# Patient Record
Sex: FEMALE | Race: WHITE | NOT HISPANIC OR LATINO | Employment: FULL TIME | URBAN - METROPOLITAN AREA
[De-identification: names, ages, dates, MRNs, and addresses within clinical notes are randomized per-mention and may not be internally consistent; named-entity substitution may affect disease eponyms.]

---

## 2022-01-19 ENCOUNTER — TELEPHONE (OUTPATIENT)
Dept: NEUROLOGY | Facility: CLINIC | Age: 52
End: 2022-01-19
Payer: COMMERCIAL

## 2022-01-19 NOTE — TELEPHONE ENCOUNTER
----- Message from Aura Mensah sent at 1/19/2022  3:21 PM CST -----  Contact: Patient  Type:   Appointment Request    Name of Caller:Patient   When is the first available appointment?no appointment generated  Symptoms:Referral by podiatry for nerve pain  Would the patient rather a call back or a response via [facility]? Call back  Best Call Back Number:[phone]  Additional Information: Please assist

## 2022-01-19 NOTE — TELEPHONE ENCOUNTER
Spoke with pt in regards to needing appt to be scheduled. Advise her she would need a referral by an [facility] in regards to getting scheduled. Pt verbalized understanding.

## 2022-01-27 PROBLEM — Z87.42 HISTORY OF ABNORMAL CERVICAL PAP SMEAR: Status: ACTIVE | Noted: 2022-01-27

## 2022-05-20 ENCOUNTER — OFFICE VISIT (OUTPATIENT)
Dept: UROLOGY | Facility: CLINIC | Age: 52
End: 2022-05-20
Payer: COMMERCIAL

## 2022-05-20 VITALS
WEIGHT: 179 LBS | DIASTOLIC BLOOD PRESSURE: 80 MMHG | HEIGHT: 72 IN | SYSTOLIC BLOOD PRESSURE: 110 MMHG | BODY MASS INDEX: 24.24 KG/M2

## 2022-05-20 DIAGNOSIS — N30.00 ACUTE CYSTITIS WITHOUT HEMATURIA: ICD-10-CM

## 2022-05-20 DIAGNOSIS — R10.2 PELVIC PAIN: Primary | ICD-10-CM

## 2022-05-20 DIAGNOSIS — R39.15 URINARY URGENCY: ICD-10-CM

## 2022-05-20 PROCEDURE — 1159F PR MEDICATION LIST DOCUMENTED IN MEDICAL RECORD: ICD-10-PCS | Mod: CPTII,S$GLB,, | Performed by: UROLOGY

## 2022-05-20 PROCEDURE — 3079F DIAST BP 80-89 MM HG: CPT | Mod: CPTII,S$GLB,, | Performed by: UROLOGY

## 2022-05-20 PROCEDURE — 87186 SC STD MICRODIL/AGAR DIL: CPT | Performed by: UROLOGY

## 2022-05-20 PROCEDURE — 3008F PR BODY MASS INDEX (BMI) DOCUMENTED: ICD-10-PCS | Mod: CPTII,S$GLB,, | Performed by: UROLOGY

## 2022-05-20 PROCEDURE — 1159F MED LIST DOCD IN RCRD: CPT | Mod: CPTII,S$GLB,, | Performed by: UROLOGY

## 2022-05-20 PROCEDURE — 99999 PR PBB SHADOW E&M-EST. PATIENT-LVL III: ICD-10-PCS | Mod: PBBFAC,,, | Performed by: UROLOGY

## 2022-05-20 PROCEDURE — 99204 PR OFFICE/OUTPT VISIT, NEW, LEVL IV, 45-59 MIN: ICD-10-PCS | Mod: S$GLB,,, | Performed by: UROLOGY

## 2022-05-20 PROCEDURE — 3074F SYST BP LT 130 MM HG: CPT | Mod: CPTII,S$GLB,, | Performed by: UROLOGY

## 2022-05-20 PROCEDURE — 3079F PR MOST RECENT DIASTOLIC BLOOD PRESSURE 80-89 MM HG: ICD-10-PCS | Mod: CPTII,S$GLB,, | Performed by: UROLOGY

## 2022-05-20 PROCEDURE — 3008F BODY MASS INDEX DOCD: CPT | Mod: CPTII,S$GLB,, | Performed by: UROLOGY

## 2022-05-20 PROCEDURE — 87088 URINE BACTERIA CULTURE: CPT | Performed by: UROLOGY

## 2022-05-20 PROCEDURE — 87077 CULTURE AEROBIC IDENTIFY: CPT | Performed by: UROLOGY

## 2022-05-20 PROCEDURE — 3074F PR MOST RECENT SYSTOLIC BLOOD PRESSURE < 130 MM HG: ICD-10-PCS | Mod: CPTII,S$GLB,, | Performed by: UROLOGY

## 2022-05-20 PROCEDURE — 99204 OFFICE O/P NEW MOD 45 MIN: CPT | Mod: S$GLB,,, | Performed by: UROLOGY

## 2022-05-20 PROCEDURE — 87086 URINE CULTURE/COLONY COUNT: CPT | Performed by: UROLOGY

## 2022-05-20 PROCEDURE — 99999 PR PBB SHADOW E&M-EST. PATIENT-LVL III: CPT | Mod: PBBFAC,,, | Performed by: UROLOGY

## 2022-05-20 RX ORDER — NAPROXEN 500 MG/1
500 TABLET ORAL
COMMUNITY
Start: 2022-04-12 | End: 2023-05-01

## 2022-05-20 RX ORDER — PREDNISOLONE SODIUM PHOSPHATE 15 MG/1
15 TABLET, ORALLY DISINTEGRATING ORAL 2 TIMES DAILY
COMMUNITY
Start: 2022-05-04 | End: 2023-05-01

## 2022-05-20 RX ORDER — SOLIFENACIN SUCCINATE 10 MG/1
10 TABLET, FILM COATED ORAL DAILY
Qty: 30 TABLET | Refills: 3 | Status: SHIPPED | OUTPATIENT
Start: 2022-05-20 | End: 2022-08-02 | Stop reason: SDUPTHER

## 2022-05-20 RX ORDER — SULFAMETHOXAZOLE AND TRIMETHOPRIM 800; 160 MG/1; MG/1
1 TABLET ORAL 2 TIMES DAILY
Qty: 14 TABLET | Refills: 0 | Status: SHIPPED | OUTPATIENT
Start: 2022-05-20 | End: 2022-08-02

## 2022-05-20 NOTE — PROGRESS NOTES
Chief Complaint   Patient presents with    Other     New patient-urge incontience       Referring Provider: Dr. Amy Monet      History of Present Illness:   Sara Trammell is a 51 y.o. female here for evaluation of Other (New patient-urge incontience)  .   5/20/22-52yo female referred for urge urinary incontinence. She has h/o pubovaginal sling in 2010 at the time of hysterectomy. She was previously seeing Dr. Minor. Pt reports a diagnosis of IC. She reports that she has flare ups that last for 7-10 days. Limits food seasoning and acidic foods. Stress also flares symptoms. She reports having sharp pain if she holds her urine any longer than 2 hours. Recently, she has developed urgency and UUI if she moves. She reports feeling a dip in her vagina, which makes BMs difficult. She reports that she did have what sounds like a rectocele repair 4 years ago. The dip has been there since the rectocele repair. Was treated for E. Coli UTI 3 months ago.   Urine started turning dark a week ago.       Review of Systems   Genitourinary: Positive for pelvic pain and urgency.   All other systems reviewed and are negative.      Past Medical History:   Diagnosis Date    MARK II (cervical intraepithelial neoplasia II)     IBS (irritable bowel syndrome)     Migraine        Past Surgical History:   Procedure Laterality Date    AUGMENTATION OF BREAST      BLADDER SUSPENSION      EYE SURGERY      FOOT SURGERY      HEMORRHOID SURGERY      REPAIR OF RECTOCELE      FLORINA LSO      MARK 2    TUBAL LIGATION         Family History   Problem Relation Age of Onset    Heart disease Maternal Grandmother        Social History     Tobacco Use    Smoking status: Former Smoker    Smokeless tobacco: Never Used       Current Outpatient Medications   Medication Sig Dispense Refill    FLUoxetine 20 MG capsule Take 1 capsule by mouth once daily.      multivitamin capsule Take 1 capsule by mouth once daily.      naproxen (NAPROSYN) 500  MG tablet Take 500 mg by mouth.      prednisoLONE (ORAPRED ODT) 15 MG disintegrating tablet Take 15 mg by mouth 2 (two) times daily.      rosuvastatin (CRESTOR) 5 MG tablet Take 1 tablet by mouth once daily.      zolpidem (AMBIEN) 10 mg Tab Take 1 tablet by mouth nightly as needed.      solifenacin (VESICARE) 10 MG tablet Take 1 tablet (10 mg total) by mouth once daily. 30 tablet 3    sulfamethoxazole-trimethoprim 800-160mg (BACTRIM DS) 800-160 mg Tab Take 1 tablet by mouth 2 (two) times daily. 14 tablet 0     No current facility-administered medications for this visit.       Review of patient's allergies indicates:   Allergen Reactions    Codeine Shortness Of Breath     Respiratory Arrest      Statins-hmg-coa reductase inhibitors Other (See Comments)     Myalgias       Physical Exam  Vitals:    05/20/22 1028   BP: 110/80     General: Well-developed, well-nourished, in no acute distress  HEENT: Normocephalic, atraumatic, extraocular movements intact  Neck: Supple, no supraclavicular or cervical lymphadenopathy, trachea midline  Respirations: even and unlabored  Back: midline spine, No CVA tenderness  Abdomen: soft, Non-tender, non-distended, no palpable masses, no rebound or guarding  : Normal external female genitalia without lesions. Orthotopic urethral meatus. Healthy vaginal mucosa. Grade 1 Cysotcele, Grade 1 Rectocele, No apical prolapse, negative cough stress test, no urethral hypermobility  Extremities: moves all equally, no clubbing, cyanosis or edema  Skin: Warm and dry. No lesions  Psych: normal affect  Neuro: Alert and oriented x 3. Cranial nerves II-XII intact      Urinalysis  Nitrite positive, otherwise negative    PVR: 41cc 5/20/22    Assessment:  1. Pelvic pain  CULTURE, URINE   2. Acute cystitis without hematuria     3. Urinary urgency           Plan:   Pelvic pain  -     CULTURE, URINE    Acute cystitis without hematuria    Urinary urgency    Other orders  -      sulfamethoxazole-trimethoprim 800-160mg (BACTRIM DS) 800-160 mg Tab; Take 1 tablet by mouth 2 (two) times daily.  Dispense: 14 tablet; Refill: 0  -     solifenacin (VESICARE) 10 MG tablet; Take 1 tablet (10 mg total) by mouth once daily.  Dispense: 30 tablet; Refill: 3        Records from Dr. Minor  Follow up in about 2 months (around 7/20/2022).

## 2022-05-23 LAB — BACTERIA UR CULT: ABNORMAL

## 2022-05-29 ENCOUNTER — PATIENT MESSAGE (OUTPATIENT)
Dept: UROLOGY | Facility: CLINIC | Age: 52
End: 2022-05-29
Payer: COMMERCIAL

## 2022-05-29 DIAGNOSIS — R39.15 URINARY URGENCY: Primary | ICD-10-CM

## 2022-05-29 DIAGNOSIS — N30.00 ACUTE CYSTITIS WITHOUT HEMATURIA: ICD-10-CM

## 2022-05-31 ENCOUNTER — PATIENT MESSAGE (OUTPATIENT)
Dept: UROLOGY | Facility: CLINIC | Age: 52
End: 2022-05-31
Payer: COMMERCIAL

## 2022-06-01 ENCOUNTER — LAB VISIT (OUTPATIENT)
Dept: LAB | Facility: HOSPITAL | Age: 52
End: 2022-06-01
Attending: UROLOGY
Payer: COMMERCIAL

## 2022-06-01 DIAGNOSIS — R39.15 URINARY URGENCY: ICD-10-CM

## 2022-06-01 LAB
BILIRUB UR QL STRIP: NEGATIVE
CLARITY UR: CLEAR
COLOR UR: YELLOW
GLUCOSE UR QL STRIP: NEGATIVE
HGB UR QL STRIP: NEGATIVE
KETONES UR QL STRIP: NEGATIVE
LEUKOCYTE ESTERASE UR QL STRIP: NEGATIVE
NITRITE UR QL STRIP: NEGATIVE
PH UR STRIP: 8 [PH] (ref 5–8)
PROT UR QL STRIP: NEGATIVE
SP GR UR STRIP: <=1.005 (ref 1–1.03)
URN SPEC COLLECT METH UR: NORMAL

## 2022-06-01 PROCEDURE — 81003 URINALYSIS AUTO W/O SCOPE: CPT | Performed by: UROLOGY

## 2022-06-01 PROCEDURE — 87086 URINE CULTURE/COLONY COUNT: CPT | Performed by: UROLOGY

## 2022-06-02 LAB
BACTERIA UR CULT: NORMAL
BACTERIA UR CULT: NORMAL

## 2022-08-02 ENCOUNTER — OFFICE VISIT (OUTPATIENT)
Dept: UROLOGY | Facility: CLINIC | Age: 52
End: 2022-08-02
Payer: COMMERCIAL

## 2022-08-02 VITALS — BODY MASS INDEX: 24.28 KG/M2 | DIASTOLIC BLOOD PRESSURE: 70 MMHG | SYSTOLIC BLOOD PRESSURE: 110 MMHG | WEIGHT: 179 LBS

## 2022-08-02 DIAGNOSIS — N30.10 INTERSTITIAL CYSTITIS: ICD-10-CM

## 2022-08-02 DIAGNOSIS — R31.29 MICROHEMATURIA: Primary | ICD-10-CM

## 2022-08-02 DIAGNOSIS — N32.81 OAB (OVERACTIVE BLADDER): ICD-10-CM

## 2022-08-02 LAB
BACTERIA #/AREA URNS AUTO: ABNORMAL /HPF
MICROSCOPIC COMMENT: ABNORMAL
RBC #/AREA URNS AUTO: 9 /HPF (ref 0–4)
WBC #/AREA URNS AUTO: 7 /HPF (ref 0–5)

## 2022-08-02 PROCEDURE — 87086 URINE CULTURE/COLONY COUNT: CPT | Performed by: UROLOGY

## 2022-08-02 PROCEDURE — 99213 PR OFFICE/OUTPT VISIT, EST, LEVL III, 20-29 MIN: ICD-10-PCS | Mod: S$GLB,,, | Performed by: UROLOGY

## 2022-08-02 PROCEDURE — 87088 URINE BACTERIA CULTURE: CPT | Performed by: UROLOGY

## 2022-08-02 PROCEDURE — 3008F BODY MASS INDEX DOCD: CPT | Mod: CPTII,S$GLB,, | Performed by: UROLOGY

## 2022-08-02 PROCEDURE — 99213 OFFICE O/P EST LOW 20 MIN: CPT | Mod: S$GLB,,, | Performed by: UROLOGY

## 2022-08-02 PROCEDURE — 87186 SC STD MICRODIL/AGAR DIL: CPT | Performed by: UROLOGY

## 2022-08-02 PROCEDURE — 3078F PR MOST RECENT DIASTOLIC BLOOD PRESSURE < 80 MM HG: ICD-10-PCS | Mod: CPTII,S$GLB,, | Performed by: UROLOGY

## 2022-08-02 PROCEDURE — 3074F SYST BP LT 130 MM HG: CPT | Mod: CPTII,S$GLB,, | Performed by: UROLOGY

## 2022-08-02 PROCEDURE — 99999 PR PBB SHADOW E&M-EST. PATIENT-LVL II: ICD-10-PCS | Mod: PBBFAC,,, | Performed by: UROLOGY

## 2022-08-02 PROCEDURE — 81001 URINALYSIS AUTO W/SCOPE: CPT | Performed by: UROLOGY

## 2022-08-02 PROCEDURE — 3008F PR BODY MASS INDEX (BMI) DOCUMENTED: ICD-10-PCS | Mod: CPTII,S$GLB,, | Performed by: UROLOGY

## 2022-08-02 PROCEDURE — 87077 CULTURE AEROBIC IDENTIFY: CPT | Performed by: UROLOGY

## 2022-08-02 PROCEDURE — 3078F DIAST BP <80 MM HG: CPT | Mod: CPTII,S$GLB,, | Performed by: UROLOGY

## 2022-08-02 PROCEDURE — 3074F PR MOST RECENT SYSTOLIC BLOOD PRESSURE < 130 MM HG: ICD-10-PCS | Mod: CPTII,S$GLB,, | Performed by: UROLOGY

## 2022-08-02 PROCEDURE — 99999 PR PBB SHADOW E&M-EST. PATIENT-LVL II: CPT | Mod: PBBFAC,,, | Performed by: UROLOGY

## 2022-08-02 RX ORDER — SOLIFENACIN SUCCINATE 10 MG/1
10 TABLET, FILM COATED ORAL DAILY
Qty: 30 TABLET | Refills: 11 | Status: SHIPPED | OUTPATIENT
Start: 2022-08-02 | End: 2023-05-01 | Stop reason: SDUPTHER

## 2022-08-02 RX ORDER — GABAPENTIN 100 MG/1
1 CAPSULE ORAL 3 TIMES DAILY
COMMUNITY
Start: 2022-07-21 | End: 2023-05-01

## 2022-08-02 NOTE — PROGRESS NOTES
CC: follow up frequency, pelvic pain    History of Present Illness:     8/2/22-On vesicare. No longer having the sharp pain. Frequency is improved. No gross hematuria or recent UTI.   5/20/22-50yo female referred for urge urinary incontinence. She has h/o pubovaginal sling in 2010 at the time of hysterectomy. She was previously seeing Dr. Minor. Pt reports a diagnosis of IC. She reports that she has flare ups that last for 7-10 days. Limits food seasoning and acidic foods. Stress also flares symptoms. She reports having sharp pain if she holds her urine any longer than 2 hours. Recently, she has developed urgency and UUI if she moves. She reports feeling a dip in her vagina, which makes BMs difficult. She reports that she did have what sounds like a rectocele repair 4 years ago. The dip has been there since the rectocele repair. Was treated for E. Coli UTI 3 months ago.   Urine started turning dark a week ago.       Review of Systems   Gastrointestinal: Positive for constipation.   Genitourinary: Negative for difficulty urinating, dysuria and hematuria.   All other systems reviewed and are negative.      Past Medical History:   Diagnosis Date    MARK II (cervical intraepithelial neoplasia II)     IBS (irritable bowel syndrome)     Migraine        Past Surgical History:   Procedure Laterality Date    AUGMENTATION OF BREAST      BLADDER SUSPENSION      EYE SURGERY      FOOT SURGERY      HEMORRHOID SURGERY      REPAIR OF RECTOCELE      FLORINA LSO      MARK 2    TUBAL LIGATION         Family History   Problem Relation Age of Onset    Colon cancer Mother     Colon cancer Father     Heart disease Maternal Grandmother        Social History     Tobacco Use    Smoking status: Former Smoker    Smokeless tobacco: Never Used       Current Outpatient Medications   Medication Sig Dispense Refill    FLUoxetine 20 MG capsule Take 1 capsule by mouth once daily.      gabapentin (NEURONTIN) 100 MG capsule Take 1  capsule by mouth 3 (three) times daily.      multivitamin capsule Take 1 capsule by mouth once daily.      rosuvastatin (CRESTOR) 5 MG tablet Take 1 tablet by mouth once daily.      zolpidem (AMBIEN) 10 mg Tab Take 1 tablet by mouth nightly as needed.      naproxen (NAPROSYN) 500 MG tablet Take 500 mg by mouth.      prednisoLONE (ORAPRED ODT) 15 MG disintegrating tablet Take 15 mg by mouth 2 (two) times daily.      solifenacin (VESICARE) 10 MG tablet Take 1 tablet (10 mg total) by mouth once daily. 30 tablet 11     No current facility-administered medications for this visit.       Review of patient's allergies indicates:   Allergen Reactions    Codeine Shortness Of Breath     Respiratory Arrest      Statins-hmg-coa reductase inhibitors Other (See Comments)     Myalgias       Physical Exam  Vitals:    08/02/22 0921   BP: 110/70     General: Well-developed, well-nourished, in no acute distress  HEENT: Normocephalic, atraumatic, extraocular movements intact  Neck: Supple, no supraclavicular or cervical lymphadenopathy, trachea midline  Respirations: even and unlabored  : 5/20/22-Normal external female genitalia without lesions. Orthotopic urethral meatus. Healthy vaginal mucosa. Grade 1 Cysotcele, Grade 1 Rectocele, No apical prolapse, negative cough stress test, no urethral hypermobility  Extremities: moves all equally, no clubbing, cyanosis or edema  Skin: Warm and dry. No lesions  Psych: normal affect  Neuro: Alert and oriented x 3. Cranial nerves II-XII intact      Urinalysis  Trace blood, otherwise negative    PVR: 41cc 5/20/22    Assessment:  1. Microhematuria  Urinalysis Microscopic    Urine culture   2. OAB (overactive bladder)     3. Interstitial cystitis           Plan:   Microhematuria  -     Urinalysis Microscopic  -     Urine culture    OAB (overactive bladder)    Interstitial cystitis    Other orders  -     solifenacin (VESICARE) 10 MG tablet; Take 1 tablet (10 mg total) by mouth once daily.   Dispense: 30 tablet; Refill: 11        Re-request Records from Dr. Minor  Follow up in about 6 months (around 2/2/2023).

## 2022-08-04 PROBLEM — N30.10 INTERSTITIAL CYSTITIS: Status: ACTIVE | Noted: 2022-08-04

## 2022-08-04 PROBLEM — N32.81 OAB (OVERACTIVE BLADDER): Status: ACTIVE | Noted: 2022-08-04

## 2022-08-05 ENCOUNTER — PATIENT MESSAGE (OUTPATIENT)
Dept: UROLOGY | Facility: CLINIC | Age: 52
End: 2022-08-05
Payer: COMMERCIAL

## 2022-08-05 LAB — BACTERIA UR CULT: ABNORMAL

## 2022-08-07 ENCOUNTER — TELEPHONE (OUTPATIENT)
Dept: UROLOGY | Facility: HOSPITAL | Age: 52
End: 2022-08-07
Payer: COMMERCIAL

## 2022-08-07 RX ORDER — NITROFURANTOIN 25; 75 MG/1; MG/1
100 CAPSULE ORAL 2 TIMES DAILY
Qty: 14 CAPSULE | Refills: 0 | Status: SHIPPED | OUTPATIENT
Start: 2022-08-07 | End: 2023-05-01

## 2022-08-07 NOTE — TELEPHONE ENCOUNTER
Called pt regarding positive urine culture. Macrobid sent to pharmacy. Pt expressed understanding.

## 2022-09-27 ENCOUNTER — PATIENT MESSAGE (OUTPATIENT)
Dept: UROLOGY | Facility: CLINIC | Age: 52
End: 2022-09-27
Payer: COMMERCIAL

## 2022-09-27 ENCOUNTER — TELEPHONE (OUTPATIENT)
Dept: UROLOGY | Facility: CLINIC | Age: 52
End: 2022-09-27
Payer: COMMERCIAL

## 2022-09-27 ENCOUNTER — LAB VISIT (OUTPATIENT)
Dept: LAB | Facility: HOSPITAL | Age: 52
End: 2022-09-27
Attending: UROLOGY
Payer: COMMERCIAL

## 2022-09-27 DIAGNOSIS — R39.15 URINARY URGENCY: ICD-10-CM

## 2022-09-27 DIAGNOSIS — R39.15 URINARY URGENCY: Primary | ICD-10-CM

## 2022-09-27 LAB
BILIRUB UR QL STRIP: NEGATIVE
CLARITY UR REFRACT.AUTO: CLEAR
COLOR UR AUTO: YELLOW
GLUCOSE UR QL STRIP: NEGATIVE
HGB UR QL STRIP: NEGATIVE
KETONES UR QL STRIP: NEGATIVE
LEUKOCYTE ESTERASE UR QL STRIP: NEGATIVE
NITRITE UR QL STRIP: NEGATIVE
PH UR STRIP: 8 [PH] (ref 5–8)
PROT UR QL STRIP: NEGATIVE
SP GR UR STRIP: 1.01 (ref 1–1.03)
URN SPEC COLLECT METH UR: NORMAL

## 2022-09-27 PROCEDURE — 81003 URINALYSIS AUTO W/O SCOPE: CPT | Performed by: UROLOGY

## 2022-09-27 PROCEDURE — 87086 URINE CULTURE/COLONY COUNT: CPT | Performed by: UROLOGY

## 2022-09-27 NOTE — TELEPHONE ENCOUNTER
Contacted pt regarding UTI. Pt states she's having an odor and cloudy urine. Pt will do urine culture and urinalysis today. Pt will like antibiotic to be send to [facility]    DISPLAY PLAN FREE TEXT

## 2022-09-28 ENCOUNTER — TELEPHONE (OUTPATIENT)
Dept: UROLOGY | Facility: CLINIC | Age: 52
End: 2022-09-28
Payer: COMMERCIAL

## 2022-09-28 RX ORDER — CEFDINIR 300 MG/1
300 CAPSULE ORAL 2 TIMES DAILY
Qty: 14 CAPSULE | Refills: 0 | Status: SHIPPED | OUTPATIENT
Start: 2022-09-28 | End: 2022-10-05

## 2022-09-28 NOTE — TELEPHONE ENCOUNTER
Notify patient UA looked clear, but I have sent cefdinir to her pharmacy that she can start until the culture returns.

## 2022-09-28 NOTE — TELEPHONE ENCOUNTER
Called and spoke with pt, pt informed that u/a is clear but for her to take the antibiotic she called out until her culture comes back. Pt verbalized understanding.

## 2022-09-29 LAB — BACTERIA UR CULT: NO GROWTH

## 2023-05-01 ENCOUNTER — OFFICE VISIT (OUTPATIENT)
Dept: UROLOGY | Facility: CLINIC | Age: 53
End: 2023-05-01
Payer: COMMERCIAL

## 2023-05-01 VITALS
BODY MASS INDEX: 24.01 KG/M2 | HEART RATE: 76 BPM | WEIGHT: 177 LBS | DIASTOLIC BLOOD PRESSURE: 80 MMHG | SYSTOLIC BLOOD PRESSURE: 120 MMHG

## 2023-05-01 DIAGNOSIS — N30.10 INTERSTITIAL CYSTITIS: ICD-10-CM

## 2023-05-01 DIAGNOSIS — R39.15 URINARY URGENCY: Primary | ICD-10-CM

## 2023-05-01 DIAGNOSIS — K59.09 CHRONIC CONSTIPATION: ICD-10-CM

## 2023-05-01 LAB
BILIRUB SERPL-MCNC: ABNORMAL MG/DL
BLOOD URINE, POC: ABNORMAL
CLARITY, POC UA: CLEAR
COLOR, POC UA: YELLOW
GLUCOSE UR QL STRIP: ABNORMAL
KETONES UR QL STRIP: ABNORMAL
LEUKOCYTE ESTERASE URINE, POC: ABNORMAL
NITRITE, POC UA: ABNORMAL
PH, POC UA: 6.5
PROTEIN, POC: ABNORMAL
SPECIFIC GRAVITY, POC UA: 1.01
UROBILINOGEN, POC UA: 0.2

## 2023-05-01 PROCEDURE — 1159F PR MEDICATION LIST DOCUMENTED IN MEDICAL RECORD: ICD-10-PCS | Mod: CPTII,S$GLB,, | Performed by: UROLOGY

## 2023-05-01 PROCEDURE — 1159F MED LIST DOCD IN RCRD: CPT | Mod: CPTII,S$GLB,, | Performed by: UROLOGY

## 2023-05-01 PROCEDURE — 3008F PR BODY MASS INDEX (BMI) DOCUMENTED: ICD-10-PCS | Mod: CPTII,S$GLB,, | Performed by: UROLOGY

## 2023-05-01 PROCEDURE — 81002 URINALYSIS NONAUTO W/O SCOPE: CPT | Mod: S$GLB,,, | Performed by: UROLOGY

## 2023-05-01 PROCEDURE — 3074F PR MOST RECENT SYSTOLIC BLOOD PRESSURE < 130 MM HG: ICD-10-PCS | Mod: CPTII,S$GLB,, | Performed by: UROLOGY

## 2023-05-01 PROCEDURE — 1160F RVW MEDS BY RX/DR IN RCRD: CPT | Mod: CPTII,S$GLB,, | Performed by: UROLOGY

## 2023-05-01 PROCEDURE — 3079F DIAST BP 80-89 MM HG: CPT | Mod: CPTII,S$GLB,, | Performed by: UROLOGY

## 2023-05-01 PROCEDURE — 3079F PR MOST RECENT DIASTOLIC BLOOD PRESSURE 80-89 MM HG: ICD-10-PCS | Mod: CPTII,S$GLB,, | Performed by: UROLOGY

## 2023-05-01 PROCEDURE — 99999 PR PBB SHADOW E&M-EST. PATIENT-LVL III: ICD-10-PCS | Mod: PBBFAC,,, | Performed by: UROLOGY

## 2023-05-01 PROCEDURE — 3074F SYST BP LT 130 MM HG: CPT | Mod: CPTII,S$GLB,, | Performed by: UROLOGY

## 2023-05-01 PROCEDURE — 99214 PR OFFICE/OUTPT VISIT, EST, LEVL IV, 30-39 MIN: ICD-10-PCS | Mod: S$GLB,,, | Performed by: UROLOGY

## 2023-05-01 PROCEDURE — 99214 OFFICE O/P EST MOD 30 MIN: CPT | Mod: S$GLB,,, | Performed by: UROLOGY

## 2023-05-01 PROCEDURE — 1160F PR REVIEW ALL MEDS BY PRESCRIBER/CLIN PHARMACIST DOCUMENTED: ICD-10-PCS | Mod: CPTII,S$GLB,, | Performed by: UROLOGY

## 2023-05-01 PROCEDURE — 99999 PR PBB SHADOW E&M-EST. PATIENT-LVL III: CPT | Mod: PBBFAC,,, | Performed by: UROLOGY

## 2023-05-01 PROCEDURE — 3008F BODY MASS INDEX DOCD: CPT | Mod: CPTII,S$GLB,, | Performed by: UROLOGY

## 2023-05-01 PROCEDURE — 81002 POCT URINE DIPSTICK WITHOUT MICROSCOPE: ICD-10-PCS | Mod: S$GLB,,, | Performed by: UROLOGY

## 2023-05-01 RX ORDER — AMITRIPTYLINE HYDROCHLORIDE 25 MG/1
25 TABLET, FILM COATED ORAL NIGHTLY PRN
COMMUNITY
End: 2024-03-04

## 2023-05-01 RX ORDER — CELECOXIB 200 MG/1
200 CAPSULE ORAL
COMMUNITY
End: 2024-03-04

## 2023-05-01 RX ORDER — PITAVASTATIN CALCIUM 2.09 MG/1
2 TABLET, FILM COATED ORAL NIGHTLY
COMMUNITY
End: 2024-03-04

## 2023-05-01 RX ORDER — CEFDINIR 300 MG/1
300 CAPSULE ORAL 2 TIMES DAILY
COMMUNITY
End: 2024-03-04

## 2023-05-01 RX ORDER — SOLIFENACIN SUCCINATE 10 MG/1
10 TABLET, FILM COATED ORAL DAILY
Qty: 90 TABLET | Refills: 4 | Status: SHIPPED | OUTPATIENT
Start: 2023-05-01 | End: 2024-04-30

## 2023-05-01 NOTE — PROGRESS NOTES
CC: follow up frequency, pelvic pain    History of Present Illness:     5/1/23- No pain at this point. She still has frequency because she is out of vesicare. She does have some chronic constipation, but no different.   8/2/22-On vesicare. No longer having the sharp pain. Frequency is improved. No gross hematuria or recent UTI.   5/20/22-52yo female referred for urge urinary incontinence. She has h/o pubovaginal sling in 2010 at the time of hysterectomy. She was previously seeing Dr. Minor. Pt reports a diagnosis of IC. She reports that she has flare ups that last for 7-10 days. Limits food seasoning and acidic foods. Stress also flares symptoms. She reports having sharp pain if she holds her urine any longer than 2 hours. Recently, she has developed urgency and UUI if she moves. She reports feeling a dip in her vagina, which makes BMs difficult. She reports that she did have what sounds like a rectocele repair 4 years ago. The dip has been there since the rectocele repair. Was treated for E. Coli UTI 3 months ago.   Urine started turning dark a week ago.       Review of Systems   Gastrointestinal:  Positive for constipation.   Genitourinary:  Negative for difficulty urinating, dysuria and hematuria.   All other systems reviewed and are negative.    Past Medical History:   Diagnosis Date    MARK II (cervical intraepithelial neoplasia II)     IBS (irritable bowel syndrome)     Migraine        Past Surgical History:   Procedure Laterality Date    AUGMENTATION OF BREAST      BLADDER SUSPENSION      EYE SURGERY      FOOT SURGERY      HEMORRHOID SURGERY      REPAIR OF RECTOCELE      FLORINA LSO      MARK 2    TUBAL LIGATION         Family History   Problem Relation Age of Onset    Colon cancer Mother     Colon cancer Father     Heart disease Maternal Grandmother        Social History     Tobacco Use    Smoking status: Former    Smokeless tobacco: Never       Current Outpatient Medications   Medication Sig Dispense Refill     amitriptyline (ELAVIL) 25 MG tablet Take 25 mg by mouth nightly as needed.      cefdinir (OMNICEF) 300 MG capsule Take 300 mg by mouth 2 (two) times daily.      celecoxib (CELEBREX) 200 MG capsule Take 200 mg by mouth.      pitavastatin calcium (LIVALO) 2 mg Tab tablet Take 2 mg by mouth every evening.      multivitamin capsule Take 1 capsule by mouth once daily.      solifenacin (VESICARE) 10 MG tablet Take 1 tablet (10 mg total) by mouth once daily. 90 tablet 4     No current facility-administered medications for this visit.       Review of patient's allergies indicates:   Allergen Reactions    Codeine Shortness Of Breath     Respiratory Arrest      Statins-hmg-coa reductase inhibitors Other (See Comments)     Myalgias       Physical Exam  Vitals:    05/01/23 1209   BP: 120/80   Pulse: 76     General: Well-developed, well-nourished, in no acute distress  HEENT: Normocephalic, atraumatic, extraocular movements intact  Neck: Supple, no supraclavicular or cervical lymphadenopathy, trachea midline  Respirations: even and unlabored  : 5/20/22-Normal external female genitalia without lesions. Orthotopic urethral meatus. Healthy vaginal mucosa. Grade 1 Cysotcele, Grade 1 Rectocele, No apical prolapse, negative cough stress test, no urethral hypermobility  Extremities: moves all equally, no clubbing, cyanosis or edema  Skin: Warm and dry. No lesions  Psych: normal affect  Neuro: Alert and oriented x 3. Cranial nerves II-XII intact      Urinalysis  Trace blood, otherwise negative    PVR: 41cc 5/20/22    Assessment:  1. Urinary urgency  POCT URINE DIPSTICK WITHOUT MICROSCOPE      2. Interstitial cystitis        3. Chronic constipation              Plan:   Urinary urgency  -     POCT URINE DIPSTICK WITHOUT MICROSCOPE    Interstitial cystitis    Chronic constipation    Other orders  -     solifenacin (VESICARE) 10 MG tablet; Take 1 tablet (10 mg total) by mouth once daily.  Dispense: 90 tablet; Refill: 4      Continue IC  diet  Constipation management  Follow up in about 1 year (around 5/1/2024).

## 2024-05-21 ENCOUNTER — OFFICE VISIT (OUTPATIENT)
Dept: UROLOGY | Facility: CLINIC | Age: 54
End: 2024-05-21
Payer: COMMERCIAL

## 2024-05-21 VITALS
BODY MASS INDEX: 21.08 KG/M2 | WEIGHT: 155.63 LBS | SYSTOLIC BLOOD PRESSURE: 109 MMHG | RESPIRATION RATE: 18 BRPM | DIASTOLIC BLOOD PRESSURE: 77 MMHG | HEART RATE: 76 BPM | HEIGHT: 72 IN

## 2024-05-21 DIAGNOSIS — Z01.818 PRE-OP TESTING: ICD-10-CM

## 2024-05-21 DIAGNOSIS — N30.10 INTERSTITIAL CYSTITIS: ICD-10-CM

## 2024-05-21 DIAGNOSIS — R39.15 URINARY URGENCY: Primary | ICD-10-CM

## 2024-05-21 LAB
BILIRUB UR QL STRIP: NEGATIVE
GLUCOSE UR QL STRIP: NEGATIVE
KETONES UR QL STRIP: NEGATIVE
LEUKOCYTE ESTERASE UR QL STRIP: NEGATIVE
PH, POC UA: 7.5
POC BLOOD, URINE: POSITIVE
POC NITRATES, URINE: NEGATIVE
POC RESIDUAL URINE VOLUME: 205 ML (ref 0–100)
PROT UR QL STRIP: NEGATIVE
SP GR UR STRIP: 1.01 (ref 1–1.03)
UROBILINOGEN UR STRIP-ACNC: 0.2 (ref 0.1–1.1)

## 2024-05-21 PROCEDURE — 1159F MED LIST DOCD IN RCRD: CPT | Mod: CPTII,S$GLB,, | Performed by: UROLOGY

## 2024-05-21 PROCEDURE — 51798 US URINE CAPACITY MEASURE: CPT | Mod: S$GLB,,, | Performed by: UROLOGY

## 2024-05-21 PROCEDURE — 87086 URINE CULTURE/COLONY COUNT: CPT | Performed by: UROLOGY

## 2024-05-21 PROCEDURE — 87186 SC STD MICRODIL/AGAR DIL: CPT | Performed by: UROLOGY

## 2024-05-21 PROCEDURE — 87077 CULTURE AEROBIC IDENTIFY: CPT | Performed by: UROLOGY

## 2024-05-21 PROCEDURE — 3008F BODY MASS INDEX DOCD: CPT | Mod: CPTII,S$GLB,, | Performed by: UROLOGY

## 2024-05-21 PROCEDURE — 3078F DIAST BP <80 MM HG: CPT | Mod: CPTII,S$GLB,, | Performed by: UROLOGY

## 2024-05-21 PROCEDURE — 99999 PR PBB SHADOW E&M-EST. PATIENT-LVL V: CPT | Mod: PBBFAC,,, | Performed by: UROLOGY

## 2024-05-21 PROCEDURE — 3074F SYST BP LT 130 MM HG: CPT | Mod: CPTII,S$GLB,, | Performed by: UROLOGY

## 2024-05-21 PROCEDURE — 81003 URINALYSIS AUTO W/O SCOPE: CPT | Mod: QW,S$GLB,, | Performed by: UROLOGY

## 2024-05-21 PROCEDURE — 87088 URINE BACTERIA CULTURE: CPT | Performed by: UROLOGY

## 2024-05-21 PROCEDURE — 99214 OFFICE O/P EST MOD 30 MIN: CPT | Mod: S$GLB,,, | Performed by: UROLOGY

## 2024-05-21 RX ORDER — ACETAMINOPHEN, DIPHENHYDRAMINE HCL, PHENYLEPHRINE HCL 325; 25; 5 MG/1; MG/1; MG/1
1 TABLET ORAL NIGHTLY
COMMUNITY
Start: 2023-08-01

## 2024-05-21 RX ORDER — CIPROFLOXACIN 2 MG/ML
400 INJECTION, SOLUTION INTRAVENOUS
Status: CANCELLED | OUTPATIENT
Start: 2024-05-21

## 2024-05-21 RX ORDER — LIDOCAINE HYDROCHLORIDE 20 MG/ML
JELLY TOPICAL ONCE
Status: CANCELLED | OUTPATIENT
Start: 2024-05-21 | End: 2024-05-21

## 2024-05-21 NOTE — H&P (VIEW-ONLY)
CC: follow up frequency, pelvic pain    History of Present Illness:     5/21/24-no longer having stinging pain. Feels like she is emptying. Somtimes she has a little UUI, which she describes as several drops. She has been taking vesicare and does feel that it helps. Sometimes she has spasms in her side and uses a bean bag for pressure in the LLQ.   5/1/23- No pain at this point. She still has frequency because she is out of vesicare. She does have some chronic constipation, but no different.   8/2/22-On vesicare. No longer having the sharp pain. Frequency is improved. No gross hematuria or recent UTI.   5/20/22-50yo female referred for urge urinary incontinence. She has h/o pubovaginal sling in 2010 at the time of hysterectomy. She was previously seeing Dr. Minor. Pt reports a diagnosis of IC. She reports that she has flare ups that last for 7-10 days. Limits food seasoning and acidic foods. Stress also flares symptoms. She reports having sharp pain if she holds her urine any longer than 2 hours. Recently, she has developed urgency and UUI if she moves. She reports feeling a dip in her vagina, which makes BMs difficult. She reports that she did have what sounds like a rectocele repair 4 years ago. The dip has been there since the rectocele repair. Was treated for E. Coli UTI 3 months ago.   Urine started turning dark a week ago.       Review of Systems   Gastrointestinal:  Positive for constipation.   Genitourinary:  Negative for difficulty urinating, dysuria and hematuria.   All other systems reviewed and are negative.      Past Medical History:   Diagnosis Date    MARK II (cervical intraepithelial neoplasia II)     IBS (irritable bowel syndrome)     Migraine     Urinary incontinence 2022       Past Surgical History:   Procedure Laterality Date    ABDOMINAL SURGERY      AUGMENTATION OF BREAST      BLADDER SUSPENSION      BREAST SURGERY      COLPORRHAPHY      COLPOSCOPY      EYE SURGERY      FOOT SURGERY       HEMORRHOID SURGERY      REPAIR OF RECTOCELE      FLORINA LSO      MARK 2    TUBAL LIGATION         Family History   Problem Relation Name Age of Onset    Colon cancer Mother Mother     Migraines Mother Mother     Osteoporosis Mother Mother     Colon cancer Father Father     Cancer Father Father     Heart disease Maternal Grandmother         Social History     Tobacco Use    Smoking status: Former     Types: Cigarettes    Smokeless tobacco: Never   Substance Use Topics    Alcohol use: Yes     Alcohol/week: 2.0 standard drinks of alcohol     Types: 2 Glasses of wine per week    Drug use: Not Currently       Current Outpatient Medications   Medication Sig Dispense Refill    calcium carbonate (CALTRATE 600 ORAL) Take by mouth.      ergocalciferol, vitamin D2, (VITAMIN D ORAL) Take by mouth.      melatonin 10 mg Tab Take 1 tablet by mouth every evening.      multivitamin capsule Take 1 capsule by mouth once daily.      solifenacin (VESICARE) 10 MG tablet Take 1 tablet (10 mg total) by mouth once daily. 90 tablet 4    alendronate (FOSAMAX) 70 MG tablet Take 1 tablet (70 mg total) by mouth every 7 days. 4 tablet 11     No current facility-administered medications for this visit.       Review of patient's allergies indicates:   Allergen Reactions    Codeine Shortness Of Breath     Respiratory Arrest      Statins-hmg-coa reductase inhibitors Other (See Comments)     Myalgias       Physical Exam  Vitals:    05/21/24 1559   BP: 109/77   Pulse: 76   Resp: 18     General: Well-developed, well-nourished, in no acute distress  HEENT: Normocephalic, atraumatic, extraocular movements intact  Neck: Supple, no supraclavicular or cervical lymphadenopathy, trachea midline  Respirations: even and unlabored  : 5/20/22-Normal external female genitalia without lesions. Orthotopic urethral meatus. Healthy vaginal mucosa. Grade 1 Cysotcele, Grade 1 Rectocele, No apical prolapse, negative cough stress test, no urethral hypermobility  Extremities:  moves all equally, no clubbing, cyanosis or edema  Skin: Warm and dry. No lesions  Psych: normal affect  Neuro: Alert and oriented x 3. Cranial nerves II-XII intact      Urinalysis  Trace blood, otherwise negative    PVR:   41cc 5/20/22  205mL 5/21/24    Assessment:  1. Urinary urgency  POCT Urinalysis, Dipstick, Automated, W/O Scope    POCT Bladder Scan    Place in Outpatient    Case Request Operating Room: CYSTOSCOPY,WITH BOTULINUM TOXIN INJECTION    Diet NPO    Place sequential compression device    CULTURE, URINE      2. Pre-op testing  CBC Auto Differential    Basic Metabolic Panel    X-Ray Chest PA And Lateral Pre-OP    EKG 12-lead      3. Interstitial cystitis              Plan:   Urinary urgency  -     POCT Urinalysis, Dipstick, Automated, W/O Scope  -     POCT Bladder Scan  -     Place in Outpatient; Standing  -     Case Request Operating Room: CYSTOSCOPY,WITH BOTULINUM TOXIN INJECTION  -     Diet NPO; Standing  -     Place sequential compression device; Standing  -     CULTURE, URINE    Pre-op testing  -     CBC Auto Differential; Future; Expected date: 05/21/2024  -     Basic Metabolic Panel; Future; Expected date: 05/21/2024  -     X-Ray Chest PA And Lateral Pre-OP; Future; Expected date: 05/21/2024  -     EKG 12-lead; Future    Interstitial cystitis    Other orders  -     IP VTE LOW RISK PATIENT; Standing  -     ciprofloxacin (CIPRO)400mg/200ml D5W IVPB 400 mg  -     LIDOcaine HCl 2% urojet    We discussed continued pharmacologic therapy. We also discussed botox, which she is very interested in moving forward with. We discussed the possibility of urinary retention requiring CIC and she understands this and would like to move forward. Stop vesicare.   Continue IC diet  Constipation management

## 2024-05-22 ENCOUNTER — TELEPHONE (OUTPATIENT)
Dept: PREADMISSION TESTING | Facility: HOSPITAL | Age: 54
End: 2024-05-22
Payer: COMMERCIAL

## 2024-05-22 ENCOUNTER — HOSPITAL ENCOUNTER (OUTPATIENT)
Dept: RADIOLOGY | Facility: HOSPITAL | Age: 54
Discharge: HOME | End: 2024-05-22
Attending: UROLOGY
Payer: COMMERCIAL

## 2024-05-22 ENCOUNTER — HOSPITAL ENCOUNTER (OUTPATIENT)
Dept: CARDIOLOGY | Facility: HOSPITAL | Age: 54
Discharge: HOME | End: 2024-05-22
Attending: UROLOGY
Payer: COMMERCIAL

## 2024-05-22 DIAGNOSIS — Z01.818 PRE-OP TESTING: ICD-10-CM

## 2024-05-22 PROCEDURE — 71046 X-RAY EXAM CHEST 2 VIEWS: CPT | Mod: 26,,, | Performed by: RADIOLOGY

## 2024-05-22 PROCEDURE — 93005 ELECTROCARDIOGRAM TRACING: CPT

## 2024-05-22 PROCEDURE — 93010 ELECTROCARDIOGRAM REPORT: CPT | Mod: ,,, | Performed by: INTERNAL MEDICINE

## 2024-05-22 PROCEDURE — 71046 X-RAY EXAM CHEST 2 VIEWS: CPT | Mod: TC

## 2024-05-22 NOTE — TELEPHONE ENCOUNTER
Pre op instructions reviewed with Pt over telephone, verbalized understanding.    To confirm, Surgery is scheduled on 5/23/24.   PLEASE ARRIVE AT 9:00 AM.  *Please report to the [facility] (1st Floor) located off of [facility] (2nd Entrance/Building on the left, in front of the flag pole).  Address: [address] [place] [address] [place] [address]      INSTRUCTIONS IMPORTANT!!!  DO NOT Eat, Drink, or Smoke after 12 midnight unless instructed otherwise by your Surgeon. OK to brush teeth, no gum, candy or mints!    >>>MEDICATION INSTRUCTIONS<<<: Morning of Surgery, take small sip of water with ONLY these medications:  NONE    *Diabetic/ Prediabetic Patients: !!!If you take diabetic or weight loss medication, Do NOT take morning of surgery unless instructed by Doctor!!!  Metformin to be stopped 24 hrs prior to surgery.   Long Acting Insulin Instructions: HOLD the night before surgery unless instructed differently by Provider!  Ozempic/ Mounjaro/ Wegovy/ Trulicity/ Semaglutide injections or weight loss medication to be stopped 7 days prior to surgery.    !!!STOP ALL Aspirins, NSAIDS, WEIGHT LOSS INJECTIONS/PILLS, Herbal supplements, & Vitamins 7 DAYS BEFORE SURGERY!!!    ____  Avoid Alcoholic beverages 3 days prior to surgery, as it can thin the blood.  ____  NO Acrylic/fake nails or nail polish worn day of surgery (specifically hand/arm & foot surgeries).  ____  NO powder, lotions, deodorants, oils or cream on body.  ____  Remove all jewelry & piercings & foreign objects before arrival & leave at home.  ____  Remove Dentures, Hearing Aids & Contact Lens prior to surgery.  ____  Bring photo ID and insurance information to hospital (Leave Valuables at Home).  ____  If going home the same day, arrange for a ride home. You will not be able to drive for 24 hrs if Anesthesia was used.   ____  Females (ages 11-60): may need to give a urine sample the morning of surgery; please see Pre op Nurse prior to using the  restroom.  ____  Males: Stop ED medications (Viagra, Cialis) 24 hrs prior to surgery.  ____  Wear clean, loose fitting clothing to allow for dressings/ bandages.      Bathing Instructions:    -Shower with anti-bacterial Soap (Hibiclens or Dial) the night before surgery and the morning of.   -Do not use Hibiclens on your face or genitals.   -Apply clean clothes after shower.  -Do not shave your face or body 2 days prior to surgery unless instructed otherwise by your Surgeon.  -Do not shave pubic hair 7 days prior to surgery (gyn pt's).    [facility]/Ride Policy:  Only 2 adults allowed in pre op/recovery area during your procedure. You MUST HAVE A RIDE HOME from a responsible adult that you know and trust. Medical Transport, Uber or Lyft can ONLY be used if patient has a responsible adult to accompany them during ride home.       *Signs and symptoms of Infection Before or After Surgery:               !!!If you experience any fever, chills, nausea/ vomiting, foul odor/ excessive drainage from surgical site, flu-like symptoms, new wounds or cuts, PLEASE CALL THE SURGEON OFFICE at [phone] or SEND MESSAGE THROUGH [employer] PORTAL!!!     *If you are running late the morning of surgery, please call the Hospital Surgery Dept @ [phone].     *Billing questions:  [phone]     Thank you,  -[facility] Pre Admit Dept.  [phone] or [phone]  M-F 7:30 am-4:00 pm (Closed Major Holidays)

## 2024-05-23 ENCOUNTER — ANESTHESIA EVENT (OUTPATIENT)
Dept: SURGERY | Facility: HOSPITAL | Age: 54
End: 2024-05-23
Payer: COMMERCIAL

## 2024-05-23 ENCOUNTER — HOSPITAL ENCOUNTER (OUTPATIENT)
Facility: HOSPITAL | Age: 54
Discharge: HOME | End: 2024-05-23
Attending: UROLOGY | Admitting: UROLOGY
Payer: COMMERCIAL

## 2024-05-23 ENCOUNTER — ANESTHESIA (OUTPATIENT)
Dept: SURGERY | Facility: HOSPITAL | Age: 54
End: 2024-05-23
Payer: COMMERCIAL

## 2024-05-23 DIAGNOSIS — N32.81 OAB (OVERACTIVE BLADDER): ICD-10-CM

## 2024-05-23 DIAGNOSIS — R39.15 URINARY URGENCY: Primary | ICD-10-CM

## 2024-05-23 LAB
OHS QRS DURATION: 78 MS
OHS QTC CALCULATION: 448 MS

## 2024-05-23 PROCEDURE — 37000008 HC ANESTHESIA 1ST 15 MINUTES: Performed by: UROLOGY

## 2024-05-23 PROCEDURE — 63600175 PHARM REV CODE 636 W HCPCS: Mod: JZ,JG | Performed by: UROLOGY

## 2024-05-23 PROCEDURE — 71000015 HC POSTOP RECOV 1ST HR: Performed by: UROLOGY

## 2024-05-23 PROCEDURE — 63600175 PHARM REV CODE 636 W HCPCS

## 2024-05-23 PROCEDURE — 25000003 PHARM REV CODE 250

## 2024-05-23 PROCEDURE — 71000033 HC RECOVERY, INTIAL HOUR: Performed by: UROLOGY

## 2024-05-23 PROCEDURE — 36000706: Performed by: UROLOGY

## 2024-05-23 PROCEDURE — 37000009 HC ANESTHESIA EA ADD 15 MINS: Performed by: UROLOGY

## 2024-05-23 PROCEDURE — 63600175 PHARM REV CODE 636 W HCPCS: Performed by: UROLOGY

## 2024-05-23 PROCEDURE — 36000707: Performed by: UROLOGY

## 2024-05-23 PROCEDURE — 52287 CYSTOSCOPY CHEMODENERVATION: CPT | Mod: ,,, | Performed by: UROLOGY

## 2024-05-23 RX ORDER — PROPOFOL 10 MG/ML
VIAL (ML) INTRAVENOUS
Status: DISCONTINUED | OUTPATIENT
Start: 2024-05-23 | End: 2024-05-23

## 2024-05-23 RX ORDER — MIDAZOLAM HYDROCHLORIDE 1 MG/ML
INJECTION INTRAMUSCULAR; INTRAVENOUS
Status: DISCONTINUED | OUTPATIENT
Start: 2024-05-23 | End: 2024-05-23

## 2024-05-23 RX ORDER — FENTANYL CITRATE 50 UG/ML
INJECTION, SOLUTION INTRAMUSCULAR; INTRAVENOUS
Status: DISCONTINUED | OUTPATIENT
Start: 2024-05-23 | End: 2024-05-23

## 2024-05-23 RX ORDER — CIPROFLOXACIN 500 MG/1
500 TABLET ORAL EVERY 12 HOURS
Qty: 6 TABLET | Refills: 0 | Status: SHIPPED | OUTPATIENT
Start: 2024-05-23 | End: 2024-06-18

## 2024-05-23 RX ORDER — PROPOFOL 10 MG/ML
VIAL (ML) INTRAVENOUS CONTINUOUS PRN
Status: DISCONTINUED | OUTPATIENT
Start: 2024-05-23 | End: 2024-05-23

## 2024-05-23 RX ORDER — FENTANYL CITRATE 50 UG/ML
25 INJECTION, SOLUTION INTRAMUSCULAR; INTRAVENOUS EVERY 5 MIN PRN
Status: DISCONTINUED | OUTPATIENT
Start: 2024-05-23 | End: 2024-05-23 | Stop reason: HOSPADM

## 2024-05-23 RX ORDER — ONDANSETRON HYDROCHLORIDE 2 MG/ML
4 INJECTION, SOLUTION INTRAVENOUS DAILY PRN
Status: DISCONTINUED | OUTPATIENT
Start: 2024-05-23 | End: 2024-05-23 | Stop reason: HOSPADM

## 2024-05-23 RX ORDER — ONDANSETRON HYDROCHLORIDE 2 MG/ML
INJECTION, SOLUTION INTRAVENOUS
Status: DISCONTINUED | OUTPATIENT
Start: 2024-05-23 | End: 2024-05-23

## 2024-05-23 RX ORDER — LIDOCAINE HYDROCHLORIDE 20 MG/ML
JELLY TOPICAL ONCE
Status: DISCONTINUED | OUTPATIENT
Start: 2024-05-23 | End: 2024-05-23 | Stop reason: HOSPADM

## 2024-05-23 RX ORDER — ACETAMINOPHEN 10 MG/ML
1000 INJECTION, SOLUTION INTRAVENOUS ONCE
Status: DISCONTINUED | OUTPATIENT
Start: 2024-05-23 | End: 2024-05-23 | Stop reason: HOSPADM

## 2024-05-23 RX ORDER — CIPROFLOXACIN 2 MG/ML
400 INJECTION, SOLUTION INTRAVENOUS
Status: COMPLETED | OUTPATIENT
Start: 2024-05-23 | End: 2024-05-23

## 2024-05-23 RX ORDER — DIPHENHYDRAMINE HYDROCHLORIDE 50 MG/ML
12.5 INJECTION INTRAMUSCULAR; INTRAVENOUS
Status: DISCONTINUED | OUTPATIENT
Start: 2024-05-23 | End: 2024-05-23 | Stop reason: HOSPADM

## 2024-05-23 RX ORDER — LIDOCAINE HYDROCHLORIDE 20 MG/ML
INJECTION, SOLUTION EPIDURAL; INFILTRATION; INTRACAUDAL; PERINEURAL
Status: DISCONTINUED | OUTPATIENT
Start: 2024-05-23 | End: 2024-05-23

## 2024-05-23 RX ADMIN — CIPROFLOXACIN 400 MG: 2 INJECTION, SOLUTION INTRAVENOUS at 10:05

## 2024-05-23 RX ADMIN — PROPOFOL 40 MG: 10 INJECTION, EMULSION INTRAVENOUS at 10:05

## 2024-05-23 RX ADMIN — MIDAZOLAM HYDROCHLORIDE 2 MG: 1 INJECTION, SOLUTION INTRAMUSCULAR; INTRAVENOUS at 10:05

## 2024-05-23 RX ADMIN — PROPOFOL 50 MCG/KG/MIN: 10 INJECTION, EMULSION INTRAVENOUS at 10:05

## 2024-05-23 RX ADMIN — PROPOFOL 20 MG: 10 INJECTION, EMULSION INTRAVENOUS at 10:05

## 2024-05-23 RX ADMIN — SODIUM CHLORIDE, SODIUM LACTATE, POTASSIUM CHLORIDE, AND CALCIUM CHLORIDE: .6; .31; .03; .02 INJECTION, SOLUTION INTRAVENOUS at 10:05

## 2024-05-23 RX ADMIN — LIDOCAINE HYDROCHLORIDE 30 MG: 20 INJECTION, SOLUTION EPIDURAL; INFILTRATION; INTRACAUDAL; PERINEURAL at 10:05

## 2024-05-23 RX ADMIN — FENTANYL CITRATE 50 MCG: 50 INJECTION, SOLUTION INTRAMUSCULAR; INTRAVENOUS at 10:05

## 2024-05-23 RX ADMIN — ONDANSETRON 4 MG: 2 INJECTION INTRAMUSCULAR; INTRAVENOUS at 10:05

## 2024-05-23 NOTE — ANESTHESIA PREPROCEDURE EVALUATION
05/23/2024  Sara Trammell is a 53 y.o., female    Patient Active Problem List   Diagnosis    History of abnormal cervical Pap smear    OAB (overactive bladder)    Interstitial cystitis     Past Medical History:   Diagnosis Date    MARK II (cervical intraepithelial neoplasia II)     IBS (irritable bowel syndrome)     Migraine     Urinary incontinence 2022     Past Surgical History:   Procedure Laterality Date    ABDOMINAL SURGERY      AUGMENTATION OF BREAST      BLADDER SUSPENSION      BREAST SURGERY      COLPORRHAPHY      COLPOSCOPY      EYE SURGERY      FOOT SURGERY      HEMORRHOID SURGERY      REPAIR OF RECTOCELE      FLORINA LSO      MARK 2    TUBAL LIGATION         Chemistry        Component Value Date/Time     05/22/2024 1450    K 3.9 05/22/2024 1450     05/22/2024 1450    CO2 21 (L) 05/22/2024 1450    BUN 18 05/22/2024 1450    CREATININE 1.2 05/22/2024 1450    GLU 87 05/22/2024 1450        Component Value Date/Time    CALCIUM 9.7 05/22/2024 1450    ALKPHOS 77 03/06/2024 0846    AST 21 03/06/2024 0846    ALT 15 03/06/2024 0846    BILITOT 0.4 03/06/2024 0846        Lab Results   Component Value Date    WBC 6.01 05/22/2024    HGB 12.3 05/22/2024    HCT 37.1 05/22/2024    MCV 87 05/22/2024     05/22/2024             Pre-op Assessment    I have reviewed the Patient Summary Reports.    I have reviewed the NPO Status.   I have reviewed the Medications.     Review of Systems  Anesthesia Hx:   History of prior surgery of interest to airway management or planning:  Previous anesthesia: General, MAC         Personal Hx of Anesthesia complications, Post-Operative Nausea/Vomiting, in the past, but not with recent anesthetics / prophylaxis                    Social:  Former Smoker       Hematology/Oncology:  Hematology Normal   Oncology Normal                                    Cardiovascular:  Cardiovascular Normal                  ECG has been reviewed. Normal sinus rhythm   Normal ECG   No previous ECGs available                              Pulmonary:  Pulmonary Normal                       Renal/:  Renal/ Normal                 Hepatic/GI:        IBS - on no meds          Musculoskeletal:  Musculoskeletal Normal                Neurological:      Headaches                                 Endocrine:  Endocrine Normal    Bmi 21            Physical Exam  General: Well nourished, Cooperative, Alert and Oriented    Airway:  Mallampati: II   Mouth Opening: Normal  TM Distance: Normal  Tongue: Normal  Neck ROM: Normal ROM    Dental:  Intact  Patient denies any currently loose or chipped teeth; Patient denies any removable dental appliances        Anesthesia Plan  Type of Anesthesia, risks & benefits discussed:    Anesthesia Type: MAC  Intra-op Monitoring Plan: Standard ASA Monitors  Post Op Pain Control Plan: multimodal analgesia and IV/PO Opioids PRN  Induction:  IV  Informed Consent: Informed consent signed with the Patient and all parties understand the risks and agree with anesthesia plan.  All questions answered.   ASA Score: 2  Day of Surgery Review of History & Physical: H&P Update referred to the surgeon/provider.    Ready For Surgery From Anesthesia Perspective.     .

## 2024-05-23 NOTE — TRANSFER OF CARE
Anesthesia Transfer of Care Note    Patient: Sara Trammell    Procedure(s) Performed: Procedure(s) (LRB):  CYSTOSCOPY,WITH BOTULINUM TOXIN INJECTION (N/A)    Patient location: PACU    Anesthesia Type: MAC    Transport from OR: Transported from OR on room air with adequate spontaneous ventilation    Post pain: adequate analgesia    Post assessment: no apparent anesthetic complications    Post vital signs: stable    Level of consciousness: responds to stimulation and awake    Nausea/Vomiting: no nausea/vomiting    Complications: none    Transfer of care protocol was followedComments: Report given to PACU RN at bedside. Hand off tool used. RN given opportunity to ask questions or clarify concerns. No Concerns verbalized. RN was asked if ready to assume care of patient. RN verbally confirmed. Pt. left in stable condition. SV. Vital Signs Return to Near Baseline. No s/s of distress noted.       Last vitals: Visit Vitals  /60 (BP Location: Right arm, Patient Position: Sitting)   Pulse 76   Temp 36.7 °C (98.1 °F) (Temporal)   Resp 18   Ht 6' (1.829 m)   Wt 70.3 kg (154 lb 15.7 oz)   SpO2 99%   Breastfeeding No   BMI 21.02 kg/m²

## 2024-05-23 NOTE — DISCHARGE SUMMARY
[facility] (Hospital)  Discharge Note  Short Stay    Procedure(s) (LRB):  CYSTOSCOPY,WITH BOTULINUM TOXIN INJECTION (N/A)      OUTCOME: Patient tolerated treatment/procedure well without complication and is now ready for discharge.    DISPOSITION: Home or Self Care    FINAL DIAGNOSIS:  Urinary urgency    FOLLOWUP: In clinic    DISCHARGE INSTRUCTIONS:    Discharge Procedure Orders   Diet Adult Regular     Notify your health care provider if you experience any of the following:  temperature >100.4     Notify your health care provider if you experience any of the following:  persistent nausea and vomiting or diarrhea     Notify your health care provider if you experience any of the following:  severe uncontrolled pain     No dressing needed     Activity as tolerated        TIME SPENT ON DISCHARGE: 10 minutes

## 2024-05-23 NOTE — OP NOTE
Date: 05/23/2024      Procedure: Cystoscopy w/Botox Injection     Surgeon: Arabella Koehler MD    Pre-op Diagnosis  Urge incontinence      Post-op Diagnosis  Same    Estimated Blood Loss: 3cc    Drains: none    Complications: None    Specimens: No specimens collected during this procedure.    Implants: * No implants in log *    Disposition: PACU - hemodynamically stable.    Condition: stable    Indication for procedure:    54yo female, with UUI and OAB, presents for botox      Procedure in detail:    Informed consent was obtained. The patient was premedicated and brought back to the operative suite. They were placed on the cystoscopy table in the supine position. Sequential compression devices were placed on her lower extremities bilaterally. They underwent anesthesia. They were then placed in the dorsal lithotomy position and all pressure points were padded. The genitalia was prepped and draped in the usual sterile fashion. A formal timeout was performed. I began the procedure by advancing the 21 Persian rigid cystoscope into the patient's urethra and bladder access was obtained. Systematic review was performed of the bladder revealing no evidence of stone, foreign body or tumor. Bilateral ureteral orifices were visualized and noted to be effluxing clear urine. 100 units of Botox was mixed with 9 mL of injectable saline. This was drawn up into a syringe on the operative field. I advanced the Botox needle through the cystoscope and into the bladder. I started at the lower posterior bladder wall and injected approximately 0.5 mL per injection site into the detrusor muscle. After the Botox was injected into approximately 18 different sites total, 3 mL normal saline flush was then injected into the detrusor muscle to completely utilize all of the Botox within the needle. Bleeding was minimal. The bladder was then drained and the scope was removed. The patient tolerated the procedure well and awakened in good  condition.

## 2024-05-24 ENCOUNTER — TELEPHONE (OUTPATIENT)
Dept: UROLOGY | Facility: CLINIC | Age: 54
End: 2024-05-24
Payer: COMMERCIAL

## 2024-05-24 VITALS
RESPIRATION RATE: 17 BRPM | HEART RATE: 61 BPM | WEIGHT: 155 LBS | HEIGHT: 72 IN | SYSTOLIC BLOOD PRESSURE: 93 MMHG | OXYGEN SATURATION: 98 % | BODY MASS INDEX: 20.99 KG/M2 | DIASTOLIC BLOOD PRESSURE: 54 MMHG | TEMPERATURE: 98 F

## 2024-05-24 LAB — BACTERIA UR CULT: ABNORMAL

## 2024-05-24 NOTE — TELEPHONE ENCOUNTER
.Outgoing call attempted to notify patient regarding below but no answer, left voice message with contact information letting patient know she can contact us at her earliest convenience or she can send us a Kony message.      ----- Message from Arabella Koehler MD sent at 5/24/2024  2:45 PM CDT -----  Please schedule follow up with me in 3 weeks.

## 2024-05-24 NOTE — ANESTHESIA POSTPROCEDURE EVALUATION
Anesthesia Post Evaluation    Patient: Sara Trammell    Procedure(s) Performed: Procedure(s) (LRB):  CYSTOSCOPY,WITH BOTULINUM TOXIN INJECTION (N/A)    Final Anesthesia Type: MAC      Patient location during evaluation: PACU  Patient participation: Yes- Able to Participate  Level of consciousness: awake and alert and oriented  Post-procedure vital signs: reviewed and stable  Pain management: adequate  Airway patency: patent  [place] mitigation strategies: Multimodal analgesia  PONV status at discharge: No PONV  Anesthetic complications: no      Cardiovascular status: blood pressure returned to baseline and hemodynamically stable  Respiratory status: unassisted  Hydration status: euvolemic  Follow-up not needed.              Vitals Value Taken Time   BP 93/54 05/23/24 1115   Temp 36.7 °C (98 °F) 05/23/24 1115   Pulse 61 05/23/24 1115   Resp 17 05/23/24 1115   SpO2 98 % 05/23/24 1115         Event Time   Out of Recovery 11:18:19         Pain/Yaniv Score: Yaniv Score: 10 (5/23/2024 11:20 AM)

## 2024-05-27 ENCOUNTER — TELEPHONE (OUTPATIENT)
Dept: UROLOGY | Facility: CLINIC | Age: 54
End: 2024-05-27
Payer: COMMERCIAL

## 2024-05-27 NOTE — TELEPHONE ENCOUNTER
.Outgoing call placed to patient, patient verified name and date of birth, patient notified of below per Dr. Koehler, she verbalized understanding, appt scheduled as agreed per patient and no further assistance needed.    ----- Message from Arabella Koehler MD sent at 5/24/2024  2:45 PM CDT -----  Please schedule follow up with me in 3 weeks.

## 2024-06-18 ENCOUNTER — OFFICE VISIT (OUTPATIENT)
Dept: UROLOGY | Facility: CLINIC | Age: 54
End: 2024-06-18
Payer: COMMERCIAL

## 2024-06-18 VITALS
HEIGHT: 72 IN | RESPIRATION RATE: 16 BRPM | WEIGHT: 153.88 LBS | HEART RATE: 72 BPM | TEMPERATURE: 98 F | SYSTOLIC BLOOD PRESSURE: 92 MMHG | DIASTOLIC BLOOD PRESSURE: 65 MMHG | BODY MASS INDEX: 20.84 KG/M2

## 2024-06-18 DIAGNOSIS — N32.81 OAB (OVERACTIVE BLADDER): ICD-10-CM

## 2024-06-18 DIAGNOSIS — R39.15 URINARY URGENCY: Primary | ICD-10-CM

## 2024-06-18 DIAGNOSIS — N30.10 INTERSTITIAL CYSTITIS: ICD-10-CM

## 2024-06-18 LAB
BILIRUB UR QL STRIP: NEGATIVE
GLUCOSE UR QL STRIP: NEGATIVE
KETONES UR QL STRIP: NEGATIVE
LEUKOCYTE ESTERASE UR QL STRIP: NEGATIVE
PH, POC UA: 7
POC BLOOD, URINE: NEGATIVE
POC NITRATES, URINE: NEGATIVE
POC RESIDUAL URINE VOLUME: 300 ML (ref 0–100)
PROT UR QL STRIP: NEGATIVE
SP GR UR STRIP: 1.01 (ref 1–1.03)
UROBILINOGEN UR STRIP-ACNC: 0.2 (ref 0.1–1.1)

## 2024-06-18 PROCEDURE — 1159F MED LIST DOCD IN RCRD: CPT | Mod: CPTII,S$GLB,, | Performed by: UROLOGY

## 2024-06-18 PROCEDURE — 51798 US URINE CAPACITY MEASURE: CPT | Mod: S$GLB,,, | Performed by: UROLOGY

## 2024-06-18 PROCEDURE — 99999 PR PBB SHADOW E&M-EST. PATIENT-LVL III: CPT | Mod: PBBFAC,,, | Performed by: UROLOGY

## 2024-06-18 PROCEDURE — 99024 POSTOP FOLLOW-UP VISIT: CPT | Mod: S$GLB,,, | Performed by: UROLOGY

## 2024-06-18 PROCEDURE — 3074F SYST BP LT 130 MM HG: CPT | Mod: CPTII,S$GLB,, | Performed by: UROLOGY

## 2024-06-18 PROCEDURE — 3078F DIAST BP <80 MM HG: CPT | Mod: CPTII,S$GLB,, | Performed by: UROLOGY

## 2024-06-18 PROCEDURE — 81003 URINALYSIS AUTO W/O SCOPE: CPT | Mod: QW,S$GLB,, | Performed by: UROLOGY

## 2024-06-18 NOTE — PROGRESS NOTES
CC: follow up botox  History of Present Illness:     6/18/24-s/p Botox 3 weeks ago. No dysuria. Feels like she is emptying. No incontinence. No longer having any pain. Constipation is stable.   5/21/24-no longer having stinging pain. Feels like she is emptying. Somtimes she has a little UUI, which she describes as several drops. She has been taking vesicare and does feel that it helps. Sometimes she has spasms in her side and uses a bean bag for pressure in the LLQ.   5/1/23- No pain at this point. She still has frequency because she is out of vesicare. She does have some chronic constipation, but no different.   8/2/22-On vesicare. No longer having the sharp pain. Frequency is improved. No gross hematuria or recent UTI.   5/20/22-50yo female referred for urge urinary incontinence. She has h/o pubovaginal sling in 2010 at the time of hysterectomy. She was previously seeing Dr. Minor. Pt reports a diagnosis of IC. She reports that she has flare ups that last for 7-10 days. Limits food seasoning and acidic foods. Stress also flares symptoms. She reports having sharp pain if she holds her urine any longer than 2 hours. Recently, she has developed urgency and UUI if she moves. She reports feeling a dip in her vagina, which makes BMs difficult. She reports that she did have what sounds like a rectocele repair 4 years ago. The dip has been there since the rectocele repair. Was treated for E. Coli UTI 3 months ago.   Urine started turning dark a week ago.       Review of Systems   Gastrointestinal:  Positive for constipation.   Genitourinary:  Negative for difficulty urinating, dysuria and hematuria.   All other systems reviewed and are negative.      Past Medical History:   Diagnosis Date    MARK II (cervical intraepithelial neoplasia II)     IBS (irritable bowel syndrome)     Migraine     Urinary incontinence 2022       Past Surgical History:   Procedure Laterality Date    ABDOMINAL SURGERY      AUGMENTATION OF BREAST       BLADDER SUSPENSION      BREAST SURGERY      COLPORRHAPHY      COLPOSCOPY      CYSTOSCOPY,WITH BOTULINUM TOXIN INJECTION N/A 05/23/2024    Procedure: CYSTOSCOPY,WITH BOTULINUM TOXIN INJECTION;  Surgeon: Arabella Koehler MD;  Location: [facility];  Service: Urology;  Laterality: N/A;    EYE SURGERY      FOOT SURGERY      gum graft Left 06/13/2024    Implant removed    HEMORRHOID SURGERY      REPAIR OF RECTOCELE      FLORINA LSO      MARK 2    TUBAL LIGATION         Family History   Problem Relation Name Age of Onset    Colon cancer Mother Mother     Migraines Mother Mother     Osteoporosis Mother Mother     Colon cancer Father Father     Cancer Father Father     Heart disease Maternal Grandmother         Social History     Tobacco Use    Smoking status: Former     Types: Cigarettes    Smokeless tobacco: Never   Substance Use Topics    Alcohol use: Yes     Alcohol/week: 2.0 standard drinks of alcohol     Types: 2 Glasses of wine per week     Comment: OCC    Drug use: Not Currently       Current Outpatient Medications   Medication Sig Dispense Refill    calcium carbonate (CALTRATE 600 ORAL) Take by mouth Daily.      ergocalciferol, vitamin D2, (VITAMIN D ORAL) Take by mouth once daily.      melatonin 10 mg Tab Take 1 tablet by mouth every evening.      multivitamin capsule Take 1 capsule by mouth once daily.       No current facility-administered medications for this visit.       Review of patient's allergies indicates:   Allergen Reactions    Codeine Shortness Of Breath     Respiratory Arrest      Statins-hmg-coa reductase inhibitors Other (See Comments)     Myalgias       Physical Exam  Vitals:    06/18/24 0850   BP: 92/65   Pulse: 72   Resp: 16   Temp: 97.5 °F (36.4 °C)     General: Well-developed, well-nourished, in no acute distress  HEENT: Normocephalic, atraumatic, extraocular movements intact  Neck: Supple, no supraclavicular or cervical lymphadenopathy, trachea midline  Respirations: even and unlabored  :  5/20/22-Normal external female genitalia without lesions. Orthotopic urethral meatus. Healthy vaginal mucosa. Grade 1 Cysotcele, Grade 1 Rectocele, No apical prolapse, negative cough stress test, no urethral hypermobility  Extremities: moves all equally, no clubbing, cyanosis or edema  Skin: Warm and dry. No lesions  Psych: normal affect  Neuro: Alert and oriented x 3. Cranial nerves II-XII intact      Urinalysis  Negative for blood, LE, nit    PVR:   41cc 5/20/22  205mL 5/21/24  300mL 6/18/24    Assessment:  1. Urinary urgency  POCT Urinalysis, Dipstick, Automated, W/O Scope    POCT Bladder Scan      2. Interstitial cystitis        3. OAB (overactive bladder)              Plan:   Urinary urgency  -     POCT Urinalysis, Dipstick, Automated, W/O Scope  -     POCT Bladder Scan    Interstitial cystitis    OAB (overactive bladder)    Asymptomatic elevated PVR today. Expect resolution over the next couple of months. Clinically doing well. No intervention at this time.   Follow up in about 3 months (around 9/18/2024).

## 2024-09-23 ENCOUNTER — OFFICE VISIT (OUTPATIENT)
Dept: UROLOGY | Facility: CLINIC | Age: 54
End: 2024-09-23
Payer: COMMERCIAL

## 2024-09-23 VITALS
HEART RATE: 63 BPM | SYSTOLIC BLOOD PRESSURE: 108 MMHG | HEIGHT: 72 IN | WEIGHT: 147.25 LBS | RESPIRATION RATE: 18 BRPM | DIASTOLIC BLOOD PRESSURE: 66 MMHG | TEMPERATURE: 98 F | BODY MASS INDEX: 19.94 KG/M2

## 2024-09-23 DIAGNOSIS — R39.15 URINARY URGENCY: Primary | ICD-10-CM

## 2024-09-23 DIAGNOSIS — N32.81 OAB (OVERACTIVE BLADDER): ICD-10-CM

## 2024-09-23 DIAGNOSIS — N30.10 INTERSTITIAL CYSTITIS: ICD-10-CM

## 2024-09-23 LAB
BILIRUB UR QL STRIP: NEGATIVE
GLUCOSE UR QL STRIP: NEGATIVE
KETONES UR QL STRIP: NEGATIVE
LEUKOCYTE ESTERASE UR QL STRIP: ABNORMAL
PH, POC UA: 0.2
POC BLOOD, URINE: POSITIVE
POC NITRATES, URINE: POSITIVE
POC RESIDUAL URINE VOLUME: 14 ML (ref 0–100)
PROT UR QL STRIP: NEGATIVE
SP GR UR STRIP: 1.01 (ref 1–1.03)
UROBILINOGEN UR STRIP-ACNC: 0.2 (ref 0.1–1.1)

## 2024-09-23 PROCEDURE — 81003 URINALYSIS AUTO W/O SCOPE: CPT | Mod: QW,S$GLB,, | Performed by: UROLOGY

## 2024-09-23 PROCEDURE — 3008F BODY MASS INDEX DOCD: CPT | Mod: CPTII,S$GLB,, | Performed by: UROLOGY

## 2024-09-23 PROCEDURE — 99213 OFFICE O/P EST LOW 20 MIN: CPT | Mod: S$GLB,,, | Performed by: UROLOGY

## 2024-09-23 PROCEDURE — 87186 SC STD MICRODIL/AGAR DIL: CPT | Performed by: UROLOGY

## 2024-09-23 PROCEDURE — 1159F MED LIST DOCD IN RCRD: CPT | Mod: CPTII,S$GLB,, | Performed by: UROLOGY

## 2024-09-23 PROCEDURE — 3078F DIAST BP <80 MM HG: CPT | Mod: CPTII,S$GLB,, | Performed by: UROLOGY

## 2024-09-23 PROCEDURE — 87086 URINE CULTURE/COLONY COUNT: CPT | Performed by: UROLOGY

## 2024-09-23 PROCEDURE — 51798 US URINE CAPACITY MEASURE: CPT | Mod: S$GLB,,, | Performed by: UROLOGY

## 2024-09-23 PROCEDURE — 3074F SYST BP LT 130 MM HG: CPT | Mod: CPTII,S$GLB,, | Performed by: UROLOGY

## 2024-09-23 PROCEDURE — 87088 URINE BACTERIA CULTURE: CPT | Performed by: UROLOGY

## 2024-09-23 PROCEDURE — 99999 PR PBB SHADOW E&M-EST. PATIENT-LVL III: CPT | Mod: PBBFAC,,, | Performed by: UROLOGY

## 2024-09-23 NOTE — PROGRESS NOTES
CC: follow up botox  History of Present Illness:       9/23/24-having some slight urgency over the past 3 weeks. No incontinence. No pain. Very pleased with the botox results.   6/18/24-s/p Botox 3 weeks ago. No dysuria. Feels like she is emptying. No incontinence. No longer having any pain. Constipation is stable.   5/21/24-no longer having stinging pain. Feels like she is emptying. Somtimes she has a little UUI, which she describes as several drops. She has been taking vesicare and does feel that it helps. Sometimes she has spasms in her side and uses a bean bag for pressure in the LLQ.   5/1/23- No pain at this point. She still has frequency because she is out of vesicare. She does have some chronic constipation, but no different.   8/2/22-On vesicare. No longer having the sharp pain. Frequency is improved. No gross hematuria or recent UTI.   5/20/22-50yo female referred for urge urinary incontinence. She has h/o pubovaginal sling in 2010 at the time of hysterectomy. She was previously seeing Dr. Minor. Pt reports a diagnosis of IC. She reports that she has flare ups that last for 7-10 days. Limits food seasoning and acidic foods. Stress also flares symptoms. She reports having sharp pain if she holds her urine any longer than 2 hours. Recently, she has developed urgency and UUI if she moves. She reports feeling a dip in her vagina, which makes BMs difficult. She reports that she did have what sounds like a rectocele repair 4 years ago. The dip has been there since the rectocele repair. Was treated for E. Coli UTI 3 months ago.   Urine started turning dark a week ago.       Review of Systems   Gastrointestinal:  Positive for constipation.   Genitourinary:  Negative for difficulty urinating, dysuria and hematuria.   All other systems reviewed and are negative.      Past Medical History:   Diagnosis Date    MARK II (cervical intraepithelial neoplasia II)     IBS (irritable bowel syndrome)     Migraine      Osteoporosis     Urinary incontinence 2022       Past Surgical History:   Procedure Laterality Date    ABDOMINAL SURGERY      AUGMENTATION OF BREAST      BLADDER SUSPENSION      BREAST SURGERY      COLPORRHAPHY      COLPOSCOPY      CYSTOSCOPY,WITH BOTULINUM TOXIN INJECTION N/A 05/23/2024    Procedure: CYSTOSCOPY,WITH BOTULINUM TOXIN INJECTION;  Surgeon: Arabella Koehler MD;  Location: [facility];  Service: Urology;  Laterality: N/A;    EYE SURGERY      FOOT SURGERY      gum graft Left 06/13/2024    Implant removed    HEMORRHOID SURGERY      REPAIR OF RECTOCELE      FLORINA LSO      MARK 2    TUBAL LIGATION         Family History   Problem Relation Name Age of Onset    Colon cancer Mother Mother     Migraines Mother Mother     Osteoporosis Mother Mother     Colon cancer Father Father     Cancer Father Father     Heart disease Maternal Grandmother         Social History     Tobacco Use    Smoking status: Former     Types: Cigarettes    Smokeless tobacco: Never   Substance Use Topics    Alcohol use: Yes     Alcohol/week: 2.0 standard drinks of alcohol     Types: 2 Glasses of wine per week     Comment: OCC    Drug use: Not Currently       Current Outpatient Medications   Medication Sig Dispense Refill    calcium carbonate (CALTRATE 600 ORAL) Take by mouth Daily.      ergocalciferol, vitamin D2, (VITAMIN D ORAL) Take by mouth once daily.      melatonin 10 mg Tab Take 1 tablet by mouth every evening.      multivitamin capsule Take 1 capsule by mouth once daily.      VITAMIN K2 ORAL Take by mouth once daily.       No current facility-administered medications for this visit.       Review of patient's allergies indicates:   Allergen Reactions    Codeine Shortness Of Breath     Respiratory Arrest      Statins-hmg-coa reductase inhibitors Other (See Comments)     Myalgias       Physical Exam  Vitals:    09/23/24 0937   BP: 108/66   Pulse: 63   Resp: 18   Temp: 97.5 °F (36.4 °C)     General: Well-developed, well-nourished, in no  acute distress  HEENT: Normocephalic, atraumatic, extraocular movements intact  Neck: Supple, no supraclavicular or cervical lymphadenopathy, trachea midline  Respirations: even and unlabored  : 5/20/22-Normal external female genitalia without lesions. Orthotopic urethral meatus. Healthy vaginal mucosa. Grade 1 Cysotcele, Grade 1 Rectocele, No apical prolapse, negative cough stress test, no urethral hypermobility  Extremities: moves all equally, no clubbing, cyanosis or edema  Skin: Warm and dry. No lesions  Psych: normal affect  Neuro: Alert and oriented x 3. Cranial nerves II-XII intact      Urinalysis  Trace blood, nit+    PVR: 14cc    Assessment:  1. Urinary urgency  POCT Urinalysis, Dipstick, Automated, W/O Scope    POCT Bladder Scan    Urine culture      2. OAB (overactive bladder)        3. Interstitial cystitis              Plan:   Urinary urgency  -     POCT Urinalysis, Dipstick, Automated, W/O Scope  -     POCT Bladder Scan  -     Urine culture    OAB (overactive bladder)    Interstitial cystitis      PVR back down to normal.   Discussed repeat botox around the 6 month meka. Pt has to have a couple of procedures on her feet/leg and so needs to have that done first.   Pt to send a message when she is ready for repeat botox

## 2024-09-26 ENCOUNTER — TELEPHONE (OUTPATIENT)
Dept: UROLOGY | Facility: CLINIC | Age: 54
End: 2024-09-26
Payer: COMMERCIAL

## 2024-09-26 LAB — BACTERIA UR CULT: ABNORMAL

## 2024-09-26 RX ORDER — NITROFURANTOIN 25; 75 MG/1; MG/1
100 CAPSULE ORAL 2 TIMES DAILY
Qty: 14 CAPSULE | Refills: 0 | Status: SHIPPED | OUTPATIENT
Start: 2024-09-26

## 2024-09-26 NOTE — TELEPHONE ENCOUNTER
----- Message from Arabella Koehler MD sent at 9/26/2024  8:33 AM CDT -----  Notify pt of UTI. Macrobid sent to her pharmacy.

## 2024-09-26 NOTE — TELEPHONE ENCOUNTER
.Outgoing call placed to patient, patient verified name and date of birth, patient notified of below per MD, she verbalized understanding and no further assistance needed at this time.     ----- Message from Arabella Koehler MD sent at 9/26/2024  8:33 AM CDT -----  Notify pt of UTI. Macrobid sent to her pharmacy.

## 2024-09-26 NOTE — TELEPHONE ENCOUNTER
Tried contacting patient reference to urine culture results that showed infection and that antibiotics have been sent to local pharmacy. Left voicemail reference to checking with local pharmacy and to return call. Will also send my-chart message.

## (undated) DEVICE — CONTAINER SPECIMEN OR STER 4OZ

## (undated) DEVICE — CANISTER SUCTION JUMBO 12L

## (undated) DEVICE — GOWN POLY REINF X-LONG XL

## (undated) DEVICE — TRAY SKIN SCRUB WET 4 COMPART

## (undated) DEVICE — SOL NACL IRR 3000ML

## (undated) DEVICE — SYR ONLY LUER LOCK 20CC

## (undated) DEVICE — GLOVE SIGNATURE ESSNTL LTX 6

## (undated) DEVICE — IRRIGATION SET Y-TYPE TUR/BLAD

## (undated) DEVICE — UROVIEW 2600/2800

## (undated) DEVICE — NDL INJETAK ADJ TIP 70CM

## (undated) DEVICE — SOL IRRI STRL WATER 1000ML

## (undated) DEVICE — SPONGE COTTON TRAY 4X4IN

## (undated) DEVICE — DRAPE T CYSTOSCOPY STERILE

## (undated) DEVICE — GOWN POLY REINF BRTH SLV XL

## (undated) DEVICE — TOWEL OR DISP STRL BLUE 4/PK

## (undated) DEVICE — BOWL STERILE LARGE 32OZ